# Patient Record
Sex: MALE | Race: WHITE | Employment: OTHER | URBAN - METROPOLITAN AREA
[De-identification: names, ages, dates, MRNs, and addresses within clinical notes are randomized per-mention and may not be internally consistent; named-entity substitution may affect disease eponyms.]

---

## 2022-02-01 ENCOUNTER — APPOINTMENT (OUTPATIENT)
Dept: GENERAL RADIOLOGY | Age: 32
End: 2022-02-01
Payer: COMMERCIAL

## 2022-02-01 ENCOUNTER — HOSPITAL ENCOUNTER (EMERGENCY)
Age: 32
Discharge: HOME | End: 2022-02-01
Attending: EMERGENCY MEDICINE
Payer: COMMERCIAL

## 2022-02-01 VITALS
TEMPERATURE: 97.5 F | DIASTOLIC BLOOD PRESSURE: 90 MMHG | SYSTOLIC BLOOD PRESSURE: 155 MMHG | RESPIRATION RATE: 18 BRPM | HEIGHT: 73 IN | HEART RATE: 65 BPM | WEIGHT: 265 LBS | OXYGEN SATURATION: 99 % | BODY MASS INDEX: 35.12 KG/M2

## 2022-02-01 DIAGNOSIS — B34.9 VIRAL SYNDROME: ICD-10-CM

## 2022-02-01 DIAGNOSIS — R03.0 ELEVATED BLOOD PRESSURE READING: ICD-10-CM

## 2022-02-01 DIAGNOSIS — U07.1 COVID-19 VIRUS INFECTION: Primary | ICD-10-CM

## 2022-02-01 LAB
ALBUMIN SERPL-MCNC: 3.6 GM/DL (ref 3.4–5)
ALP BLD-CCNC: 68 IU/L (ref 40–129)
ALT SERPL-CCNC: 23 U/L (ref 10–40)
ANION GAP SERPL CALCULATED.3IONS-SCNC: 15 MMOL/L (ref 4–16)
AST SERPL-CCNC: 30 IU/L (ref 15–37)
BANDED NEUTROPHILS ABSOLUTE COUNT: 0.62 K/CU MM
BANDED NEUTROPHILS RELATIVE PERCENT: 10 % (ref 5–11)
BILIRUB SERPL-MCNC: 0.4 MG/DL (ref 0–1)
BUN BLDV-MCNC: 9 MG/DL (ref 6–23)
CALCIUM SERPL-MCNC: 8.7 MG/DL (ref 8.3–10.6)
CHLORIDE BLD-SCNC: 99 MMOL/L (ref 99–110)
CO2: 22 MMOL/L (ref 21–32)
CREAT SERPL-MCNC: 0.8 MG/DL (ref 0.9–1.3)
DIFFERENTIAL TYPE: ABNORMAL
DOSE AMOUNT: NORMAL
DOSE TIME: NORMAL
GFR AFRICAN AMERICAN: >60 ML/MIN/1.73M2
GFR NON-AFRICAN AMERICAN: >60 ML/MIN/1.73M2
GLUCOSE BLD-MCNC: 81 MG/DL (ref 70–99)
HCT VFR BLD CALC: 50 % (ref 42–52)
HEMOGLOBIN: 17.3 GM/DL (ref 13.5–18)
LACTATE: 0.9 MMOL/L (ref 0.4–2)
LYMPHOCYTES ABSOLUTE: 1.6 K/CU MM
LYMPHOCYTES RELATIVE PERCENT: 25 % (ref 24–44)
MAGNESIUM: 1.9 MG/DL (ref 1.8–2.4)
MCH RBC QN AUTO: 34 PG (ref 27–31)
MCHC RBC AUTO-ENTMCNC: 34.6 % (ref 32–36)
MCV RBC AUTO: 98.2 FL (ref 78–100)
MONOCYTES ABSOLUTE: 1 K/CU MM
MONOCYTES RELATIVE PERCENT: 16 % (ref 0–4)
PDW BLD-RTO: 11.1 % (ref 11.7–14.9)
PLATELET # BLD: 154 K/CU MM (ref 140–440)
PLT MORPHOLOGY: ABNORMAL
PMV BLD AUTO: 11.9 FL (ref 7.5–11.1)
POTASSIUM SERPL-SCNC: 3.7 MMOL/L (ref 3.5–5.1)
RBC # BLD: 5.09 M/CU MM (ref 4.6–6.2)
SARS-COV-2, NAAT: DETECTED
SEGMENTED NEUTROPHILS ABSOLUTE COUNT: 3 K/CU MM
SEGMENTED NEUTROPHILS RELATIVE PERCENT: 49 % (ref 36–66)
SODIUM BLD-SCNC: 136 MMOL/L (ref 135–145)
TOTAL PROTEIN: 6.4 GM/DL (ref 6.4–8.2)
VALPROIC ACID LEVEL: 85 UG/ML (ref 50–100)
WBC # BLD: 6.2 K/CU MM (ref 4–10.5)
WBC # BLD: ABNORMAL 10*3/UL

## 2022-02-01 PROCEDURE — 6360000002 HC RX W HCPCS: Performed by: EMERGENCY MEDICINE

## 2022-02-01 PROCEDURE — 85027 COMPLETE CBC AUTOMATED: CPT

## 2022-02-01 PROCEDURE — 71045 X-RAY EXAM CHEST 1 VIEW: CPT

## 2022-02-01 PROCEDURE — 87635 SARS-COV-2 COVID-19 AMP PRB: CPT

## 2022-02-01 PROCEDURE — 85007 BL SMEAR W/DIFF WBC COUNT: CPT

## 2022-02-01 PROCEDURE — 80164 ASSAY DIPROPYLACETIC ACD TOT: CPT

## 2022-02-01 PROCEDURE — 83605 ASSAY OF LACTIC ACID: CPT

## 2022-02-01 PROCEDURE — 80053 COMPREHEN METABOLIC PANEL: CPT

## 2022-02-01 PROCEDURE — 83735 ASSAY OF MAGNESIUM: CPT

## 2022-02-01 PROCEDURE — 2580000003 HC RX 258: Performed by: EMERGENCY MEDICINE

## 2022-02-01 PROCEDURE — 96360 HYDRATION IV INFUSION INIT: CPT

## 2022-02-01 PROCEDURE — 96361 HYDRATE IV INFUSION ADD-ON: CPT

## 2022-02-01 PROCEDURE — 96372 THER/PROPH/DIAG INJ SC/IM: CPT

## 2022-02-01 PROCEDURE — 99285 EMERGENCY DEPT VISIT HI MDM: CPT

## 2022-02-01 RX ORDER — 0.9 % SODIUM CHLORIDE 0.9 %
1000 INTRAVENOUS SOLUTION INTRAVENOUS ONCE
Status: COMPLETED | OUTPATIENT
Start: 2022-02-01 | End: 2022-02-01

## 2022-02-01 RX ORDER — ONDANSETRON 4 MG/1
4 TABLET, ORALLY DISINTEGRATING ORAL 3 TIMES DAILY PRN
Qty: 21 TABLET | Refills: 0 | Status: SHIPPED | OUTPATIENT
Start: 2022-02-01

## 2022-02-01 RX ORDER — ALBUTEROL SULFATE 90 UG/1
2 AEROSOL, METERED RESPIRATORY (INHALATION) 4 TIMES DAILY PRN
Qty: 54 G | Refills: 1 | Status: SHIPPED | OUTPATIENT
Start: 2022-02-01

## 2022-02-01 RX ORDER — ONDANSETRON 2 MG/ML
4 INJECTION INTRAMUSCULAR; INTRAVENOUS ONCE
Status: DISCONTINUED | OUTPATIENT
Start: 2022-02-01 | End: 2022-02-01 | Stop reason: HOSPADM

## 2022-02-01 RX ORDER — GUAIFENESIN 100 MG/5ML
10 SYRUP ORAL EVERY 4 HOURS PRN
Qty: 236 ML | Refills: 0 | Status: SHIPPED | OUTPATIENT
Start: 2022-02-01

## 2022-02-01 RX ORDER — LORAZEPAM 2 MG/ML
1 INJECTION INTRAMUSCULAR ONCE
Status: COMPLETED | OUTPATIENT
Start: 2022-02-01 | End: 2022-02-01

## 2022-02-01 RX ADMIN — SODIUM CHLORIDE 1000 ML: 9 INJECTION, SOLUTION INTRAVENOUS at 11:23

## 2022-02-01 RX ADMIN — LORAZEPAM 1 MG: 2 INJECTION INTRAMUSCULAR at 10:37

## 2022-02-01 NOTE — ED NOTES
Discussed discharge instructions with patient's brother. All questions answered. Patient's brother verbalized understanding.           Oleg Mena RN  02/01/22 [address] [facility] [address], RN  02/01/22 9241

## 2022-02-01 NOTE — ED PROVIDER NOTES
Emergency Department Encounter    Patient: Kiarra Olvera  MRN: [id number]  : 1990  Date of Evaluation: 2022  ED Provider:  Hailey Cruz MD      Triage Chief Complaint:   Concern For COVID-19 (pt is autistic and lives at home with parents, parents have been sick with covid, older brother brought pt in because pt has not been eating or drinking in the last few days, pt has not had much urine output so family worried he may be dehydrated, pt had 1 episode of emesis last night )      [place]:  Kiarra Olvera is a 34 y.o. male that presents to the emergency department with brother who is able to provide history. The patient's mother who is legal guardian is readily available to assist and provide consent. Mother indicates that over about the past week, patient has been having a constellation of symptoms that started with a \"cold. \"  He developed a dry cough and likely head congestion. He had a few episodes of diarrhea, that resolved about 2 days ago. Family is concerned that his appetite appears diminished. They deny any vomiting. Urine output appears diminished, and they are concerned he may be dehydrated. They are concerned that his clothes seem to be fitting more loosely and that he may have lost weight. They have not specifically weighed him. Both mother and father were diagnosed with COVID-19 on 2021. Patient is vaccinated but has not had a booster. Patient has history of moderate to severe autism, limiting his ability to participate with history and physical exam.  Family reports no other particular provocative or alleviating factors. ROS - see HPI, below listed is current ROS at time of my eval:  CONSTITUTIONAL: No fevers, chills, or sweats. EYES: No eye redness or drainage. HENT: As above. No apparent difficulty swallowing. RESPIRATORY: No feeding intolerance or distress. CARDIOVASCULAR: No feeding intolerance, orthopnea, or edema.   GASTROINTESTINAL: As above.  GENITOURINARY: As above. No reported hematuria. MUSCULOSKELETAL: No recent injury. No neck, back, or extremity pain. NEUROLOGICAL: No focal weakness, numbness, or tingling. SKIN: No rashes or other lesions reported. No yellowing of the skin. Medical history:  Past Medical History:   Diagnosis Date    Autistic disorder     Seizures ([facility] [address]      Past Surgical History:   Procedure Laterality Date    EYE SURGERY       History reviewed. No pertinent family history. Social History     Socioeconomic History    Marital status: Single     Spouse name: Not on file    Number of children: Not on file    Years of education: Not on file    Highest education level: Not on file   Occupational History    Not on file   Tobacco Use    Smoking status: Never Smoker    Smokeless tobacco: Never Used   Substance and Sexual Activity    Alcohol use: No    Drug use: No    Sexual activity: Not on file   Other Topics Concern    Not on file   Social History Narrative    Not on file     Social Determinants of Health     Financial Resource Strain:     Difficulty of Paying Living Expenses: Not on file   Food Insecurity:     Worried About Running Out of Food in the Last Year: Not on file    Naomie of Food in the Last Year: Not on file   Transportation Needs:     Lack of Transportation (Medical): Not on file    Lack of Transportation (Non-Medical):  Not on file   Physical Activity:     Days of Exercise per Week: Not on file    Minutes of Exercise per Session: Not on file   Stress:     Feeling of Stress : Not on file   Social Connections:     Frequency of Communication with Friends and Family: Not on file    Frequency of Social Gatherings with Friends and Family: Not on file    Attends Hoahaoism Services: Not on file    Active Member of Clubs or Organizations: Not on file    Attends Club or Organization Meetings: Not on file    Marital Status: Not on file   Intimate Partner Violence:     Fear of Current or Ex-Partner: Not on file    Emotionally Abused: Not on file    Physically Abused: Not on file    Sexually Abused: Not on file   Housing Stability:     Unable to Pay for Housing in the Last Year: Not on file    Number of Places Lived in the Last Year: Not on file    Unstable Housing in the Last Year: Not on file     No current facility-administered medications for this encounter. Current Outpatient Medications   Medication Sig Dispense Refill    ondansetron (ZOFRAN-ODT) 4 MG disintegrating tablet Take 1 tablet by mouth 3 times daily as needed for Nausea or Vomiting 21 tablet 0    albuterol sulfate HFA (VENTOLIN HFA) 108 (90 Base) MCG/ACT inhaler Inhale 2 puffs into the lungs 4 times daily as needed for Wheezing or Shortness of Breath With spacer 54 g 1    guaiFENesin (ALTARUSSIN) 100 MG/5ML syrup Take 10 mLs by mouth every 4 hours as needed for Cough 236 mL 0    DULoxetine (CYMBALTA) 30 MG extended release capsule Take 30 mg by mouth daily      benztropine (COGENTIN) 2 MG tablet Take 2 mg by mouth 2 times daily      PARoxetine (PAXIL) 10 MG tablet Take 10 mg by mouth every morning.  QUEtiapine (SEROQUEL) 25 MG tablet Take 25 mg by mouth 2 times daily.  LORazepam (ATIVAN) 0.5 MG tablet Take 0.5 mg by mouth every 6 hours as needed for Anxiety.  divalproex (DEPAKOTE) 500 MG DR tablet Take 500 mg by mouth 2 times daily.  pantoprazole (PROTONIX) 40 MG tablet Take 40 mg by mouth daily.  linaclotide (LINZESS) 145 MCG capsule Take 145 mcg by mouth every morning (before breakfast). No Known Allergies    Nursing Notes Reviewed    Physical Exam:  Triage VS:    ED Triage Vitals   Enc Vitals Group      BP       Pulse       Resp       Temp       Temp src       SpO2       Weight       Height       Head Circumference       Peak Flow       Pain Score       Pain Loc       Pain Edu? Excl. in [address]?         My pulse ox interpretation is -94% at triage    GENERAL: Patient is awake, alert, and oriented appropriately. Patient is resting comfortably in a still position on the exam table. Patient speaking in full and complete sentences. HEENT: Normocephalic and atraumatic. Pupils equal, round, and reactive to light. No redness or matting. Bilateral external ears are unremarkable. Nasal mucosa is pink without purulence. Oral mucosa is mildly dry with chapped lips. NECK: Supple with normal range of motion. No Kernig's or Brudzinski sign. No JVD. No significant lymphadenopathy. RESPIRATORY: Symmetric aeration. No respiratory distress. Faint coarse sounds throughout with no wheeze, stridor, rales, rhonchi. Chest wall stable and nontender. CARDIOVASCULAR: Regular rate and rhythm. No murmurs, rubs, or gallops. No central or peripheral cyanosis. GASTROINTESTINAL: Soft, nontender, and nondistended. No McBurney's or Martínez's point tenderness. No other focal tenderness. No involuntary guarding, rebound, or rigidity. No mass or pulsatile mass. Bowel sounds normal.  No CVA tenderness. NEUROLOGICAL: Awake, alert and oriented to self and family. GCS 15. Cranial nerves III through XII are grossly intact as tested without facial droop or dermatomal paresthesias. Of note, forehead wrinkles are symmetric and intact. Conjugate gaze without entrapment. No asymmetry of the corners of the mouth or nasolabial folds. No gross motor or cerebellar deficits. Patient has difficulty cooperating with neurological exam, but he is seen to move all extremities spontaneously and symmetrically. He does not answer questions but is frequently singing. BACK/MUSCULOSKELETAL: No asymmetric edema or calf tenderness. No Homans sign or cords. SKIN: Normal tone for ethnicity. Normal turgor and brisk capillary refill peripherally. Emergency department course. Patient is brought to bed 2 and assessed and reassessed by me.   After initial evaluation, orders are placed for medical screening studies including CBC, metabolic panel, urinalysis, and rapid Covid. Chest x-ray is ordered. Patient appears somewhat dehydrated on examination, though blood pressure and heart rate are acceptable. IV fluids are ordered along with ondansetron 4 mg and lorazepam 1 mg to facilitate cooperation with IV placement. Patient is agreeable to continuing plan. Patient has been assessed and reassessed on several occasions. He remains generally well and clinically stable. There has been normal saturations on room air. Family and I have discussed options for disposition. The average patient with COVID-19 and normal oxygen saturations would likely be still a good candidate for home management. With the patient's underlying autism and his parents recent COVID-23 diagnosis, we had discussed possible medical management for hydration in the hospital.  Family had some concerns about visitation policy and that being alone could cause unnecessary agitation with the patient. After shared decision-making, family would like to attempt for home management. As noted, patient is saturating normally on room air, and laboratory testing is generally reassuring. I believe this is reasonable decision. We have discussed very careful return and follow-up instructions. We have discussed all available results. Patient is satisfied with evaluation and agreeable to recommendations. Patient has had the opportunity to ask questions, and they have been answered to the best of my ability. Instructions are given to follow-up with primary care provider for reevaluation and further testing. Very strict return and follow-up instructions are provided. Patient seen during [facility], I did don appropriate PPE during my encounters with the patient, including n95 (when appropriate) mask and eye protection as appropriate.     I have reviewed and interpreted all of the currently available lab results from this visit (if applicable):  Results for orders placed or performed during the hospital encounter of 02/01/22   COVID-19, Rapid    Specimen: Nasopharyngeal   Result Value Ref Range    SARS-CoV-2, NAAT DETECTED (A) NOT DETECTED   CBC Auto Differential   Result Value Ref Range    WBC 6.2 4.0 - 10.5 K/CU MM    RBC 5.09 4.6 - 6.2 M/CU MM    Hemoglobin 17.3 13.5 - 18.0 GM/DL    Hematocrit 50.0 42 - 52 %    MCV 98.2 78 - 100 FL    MCH 34.0 (H) 27 - 31 PG    MCHC 34.6 32.0 - 36.0 %    RDW 11.1 (L) 11.7 - 14.9 %    Platelets 272 404 - 945 K/CU MM    MPV 11.9 (H) 7.5 - 11.1 FL    Bands Relative 10 5 - 11 %    Segs Relative 49.0 36 - 66 %    Lymphocytes % 25.0 24 - 44 %    Monocytes % 16.0 (H) 0 - 4 %    Bands Absolute 0.62 K/CU MM    Segs Absolute 3.0 K/CU MM    Lymphocytes Absolute 1.6 K/CU MM    Monocytes Absolute 1.0 K/CU MM    Differential Type MANUAL DIFFERENTIAL     WBC Morphology OCCASIONAL     PLT Morphology PATIENT HAS VARIANT SIZE PLATELETS NOTED ON SLIDE    Comprehensive Metabolic Panel w/ Reflex to MG   Result Value Ref Range    Sodium 136 135 - 145 MMOL/L    Potassium 3.7 3.5 - 5.1 MMOL/L    Chloride 99 99 - 110 mMol/L    CO2 22 21 - 32 MMOL/L    BUN 9 6 - 23 MG/DL    CREATININE 0.8 (L) 0.9 - 1.3 MG/DL    Glucose 81 70 - 99 MG/DL    Calcium 8.7 8.3 - 10.6 MG/DL    Albumin 3.6 3.4 - 5.0 GM/DL    Total Protein 6.4 6.4 - 8.2 GM/DL    Total Bilirubin 0.4 0.0 - 1.0 MG/DL    ALT 23 10 - 40 U/L    AST 30 15 - 37 IU/L    Alkaline Phosphatase 68 40 - 129 IU/L    GFR Non-African American >60 >60 mL/min/1.73m2    GFR African American >60 >60 mL/min/1.73m2    Anion Gap 15 4 - 16   Lactic Acid, Plasma   Result Value Ref Range    Lactate 0.9 0.4 - 2.0 mMOL/L   Magnesium   Result Value Ref Range    Magnesium 1.9 1.8 - 2.4 mg/dl   Valproic acid level, total   Result Value Ref Range    Valproic Acid Lvl 85.0 50 - 100 UG/ML    DOSE AMOUNT DOSE AMT.  GIVEN - 500 mg     DOSE TIME DOSE TIME GIVEN - bid         Radiographs (if obtained):  Radiologist's Report Reviewed:  XR CHEST PORTABLE    Result Date: 2/1/2022  EXAMINATION: ONE X-RAY VIEW OF THE CHEST 2/1/2022 10:39 am COMPARISON: None. HISTORY: ORDERING SYSTEM PROVIDED HISTORY: Not eating, vomiting, Covid exposure TECHNOLOGIST PROVIDED HISTORY: Reason for exam:->Not eating, vomiting, Covid exposure Reason for Exam: Not eating, vomiting, Covid exposure FINDINGS: There are slightly low lung volumes. Heart size is within normal limits for technique. No pleural effusion or pneumothorax is seen. Suspected mild airspace opacities predominantly in the right mid and lower lung. Suspected mild airspace opacities predominantly in the right mid and lower lung. This could represent infection, including COVID-19 pneumonia. Medical decision making:  As discussed. Patient presents to the emergency department with signs and symptoms consistent with an upper respiratory infection, consistent with his COVID-19 diagnosis, particularly with his known sick contacts. Fortunately, there has been no respiratory distress, hypoxia, or cyanosis. There has been some concern about hydration and nutritional status, but renal function is intact, and there is no detectable change in renal function or inappropriate acidosis. Medical screening examination does not suggest pneumonia or other septic process. I doubt meningitis, encephalitis, cerebritis, or subarachnoid hemorrhage. There have been no meningeal findings. Abdomen has been soft and nontender. There has been no respiratory distress, hypoxia, or cyanosis. There has been no lethargy or irritability. Patient appears generally well hydrated and nontoxic. We have discussed trial of outpatient management including conscientious hydration and careful follow-up with primary care provider. Blood pressure is elevated, but there has been no complaint of headache, chest pain or discomfort, difficulty breathing, or other symptoms to suggest endorgan injury.   Recommendations are given to follow-up with primary care provider for long-term monitoring. Procedures: None. Consultations: None. Clinical Impression:  1. COVID-19 virus infection    2. Viral syndrome    3. Elevated blood pressure reading      Disposition referral (if applicable): Ying Adams MD  [place] [address] [address] [phone]    Schedule an appointment as soon as possible for a visit in 2 days      [facility]  [facility] [phone]  Go to   As needed, If symptoms worsen    Disposition medications (if applicable):  Discharge Medication List as of 2/1/2022  3:11 PM      START taking these medications    Details   albuterol sulfate HFA (VENTOLIN HFA) 108 (90 Base) MCG/ACT inhaler Inhale 2 puffs into the lungs 4 times daily as needed for Wheezing or Shortness of Breath With spacer, Disp-54 g, R-1Print      guaiFENesin (ALTARUSSIN) 100 MG/5ML syrup Take 10 mLs by mouth every 4 hours as needed for Cough, Disp-236 mL, R-0Print           ED Provider Disposition Time  DISPOSITION Decision To Discharge 02/01/2022 03:04:52 PM      Comment: Please note this report has been produced using speech recognition software and may contain errors related to that system including errors in grammar, punctuation, and spelling, as well as words and phrases that may be inappropriate. Efforts were made to edit the dictations.         Ammy Haywood MD  02/02/22 1197

## 2022-02-01 NOTE — ED NOTES
Bed: ED-02  Expected date:   Expected time:   Means of arrival:   Comments:  lauren Ponce RN  02/01/22 [id number]

## 2022-02-02 ENCOUNTER — CARE COORDINATION (OUTPATIENT)
Dept: CARE COORDINATION | Age: 32
End: 2022-02-02

## 2022-02-02 NOTE — CARE COORDINATION
First attempt. [facility] call to pt parents regarding ER / Covid follow up. No answer. Left message for pt parents requesting return call to [facility].

## 2022-02-03 NOTE — CARE COORDINATION
Second attempt. [facility] call to pts parent regarding ER / Covid follow up. No answer. Left message for pts parent requesting return call to [facility]. No further outreach to be completed.

## 2023-05-08 ENCOUNTER — OFFICE VISIT (OUTPATIENT)
Dept: NEUROLOGY | Age: 33
End: 2023-05-08
Payer: MEDICAID

## 2023-05-08 VITALS
HEART RATE: 92 BPM | SYSTOLIC BLOOD PRESSURE: 122 MMHG | OXYGEN SATURATION: 98 % | BODY MASS INDEX: 33.13 KG/M2 | HEIGHT: 73 IN | DIASTOLIC BLOOD PRESSURE: 80 MMHG | WEIGHT: 250 LBS

## 2023-05-08 DIAGNOSIS — G40.909 SEIZURE DISORDER (HCC): Primary | ICD-10-CM

## 2023-05-08 PROCEDURE — 99245 OFF/OP CONSLTJ NEW/EST HI 55: CPT | Performed by: STUDENT IN AN ORGANIZED HEALTH CARE EDUCATION/TRAINING PROGRAM

## 2023-05-08 RX ORDER — ALLOPURINOL 100 MG/1
100 TABLET ORAL
COMMUNITY

## 2023-05-08 RX ORDER — CLONIDINE HYDROCHLORIDE 0.1 MG/1
0.1 TABLET ORAL 3 TIMES DAILY
COMMUNITY

## 2023-05-08 RX ORDER — TRAZODONE HYDROCHLORIDE 50 MG/1
100 TABLET ORAL NIGHTLY
COMMUNITY

## 2023-05-08 RX ORDER — OXYBUTYNIN CHLORIDE 10 MG/1
10 TABLET, EXTENDED RELEASE ORAL DAILY
COMMUNITY
Start: 2023-03-20

## 2023-05-08 RX ORDER — PERPHENAZINE 2 MG/1
2 TABLET ORAL NIGHTLY
COMMUNITY
Start: 2023-04-23

## 2023-05-08 RX ORDER — LEVETIRACETAM 750 MG/1
750 TABLET ORAL 2 TIMES DAILY
Qty: 60 TABLET | Refills: 3 | Status: SHIPPED | OUTPATIENT
Start: 2023-06-02

## 2023-05-08 RX ORDER — LEVETIRACETAM 500 MG/1
750 TABLET ORAL 2 TIMES DAILY
Qty: 90 TABLET | Refills: 0 | Status: SHIPPED | OUTPATIENT
Start: 2023-05-08

## 2023-05-08 RX ORDER — NALTREXONE HYDROCHLORIDE 50 MG/1
50 TABLET, FILM COATED ORAL 2 TIMES DAILY
COMMUNITY

## 2023-05-08 NOTE — PATIENT INSTRUCTIONS
Week 1: Start Keppra 500 mg (one tablet)  twice daily, No change to depakote  Week 2:  Increase Keppra to 750 mg (1.5 tablets) twice daily, No change depakote  Week 3: Continue Keppra 750 mg twice daily, Decrease depakote to 250mg (1/2 tablet) three times daily  Week 4: Continue Keppra 750mg twice daily, decrease depakote 250mg twice daily   Week 5: Continue Keppra 750mg twice daily, decrease depakote 250mg once daily   Week 6: Continue Keppra 750mg twice daily, STOP depakote

## 2023-05-08 NOTE — PROGRESS NOTES
Stance   Gait/Posture: station normal, ambulates independently, and gait normal  Motor/Coordination Exam   General: no bradykinesia, mild resting tremor noted bilaterally, no chorea, no athetosis, no myoclonus, and no dyskinesia   Motor: Antigravity x4 extremities does not follow commands enough to participate with directed assessment   Coordination: No overt dysmetria  Reflexes   Reflexes Right: 2/4 biceps, brachioradialis, patellar, and achilles    Reflexes Left: 2/4 biceps, brachioradialis, patellar, and achilles    Hoffmans Reflex Right: Present   Hoffmans Reflex Left: Present    Sensory   Sensation: Attends to stimulus x4 extremities    Lungs   No auditory wheezing  Skin   Inspection: no jaundice, no lesions, no rashes, and no cyanosis      /80 (Site: Left Upper Arm, Position: Sitting, Cuff Size: Medium Adult)   Pulse 92   Ht 6' 1\" (1.854 m)   Wt 250 lb (113.4 kg)   SpO2 98%   BMI 32.98 kg/m²     Assessment and Plan     Diagnosis Orders   1. Seizure disorder (HCC)  levETIRAcetam (KEPPRA) 500 MG tablet    levETIRAcetam (KEPPRA) 750 MG tablet        Aly Islas was seen today in neurologic consultation regarding history of seizure disorder diagnosed in childhood. He has been maintained on Depakote 500 mg twice daily with last seizure greater than 2 years ago. Although he has been stable on Depakote, family does endorse that he has recently began to have tremors and are hoping that they can transition him to a newer agent with less potential side effects. We spent time today discussing multiple antiseizure medications and have ultimately elected to move forward with initiating him on Keppra given no monitoring is needed. I am going to start him on Keppra 500 mg twice daily for 1 week then have him increase to 750 mg twice daily thereafter. After having been on Keppra 750 mg twice daily for a full week,  family will begin to wean Depakote following schedule that was provided to them.   The entire

## 2023-05-30 DIAGNOSIS — G40.909 SEIZURE DISORDER (HCC): ICD-10-CM

## 2023-05-30 RX ORDER — LEVETIRACETAM 500 MG/1
750 TABLET ORAL 2 TIMES DAILY
Qty: 90 TABLET | Refills: 0 | OUTPATIENT
Start: 2023-05-30

## 2023-05-30 RX ORDER — LEVETIRACETAM 750 MG/1
TABLET ORAL
Qty: 60 TABLET | Refills: 3 | OUTPATIENT
Start: 2023-05-30

## 2023-07-28 ENCOUNTER — TELEPHONE (OUTPATIENT)
Dept: NEUROLOGY | Age: 33
End: 2023-07-28

## 2023-07-28 DIAGNOSIS — G40.909 SEIZURE DISORDER (HCC): Primary | ICD-10-CM

## 2023-07-28 NOTE — TELEPHONE ENCOUNTER
Patient's mother called and states the patient's psychiatrist would like to have Dr. Dodie Nelson call and discuss with them about medication options that would allow him to take less meds if there can be one med that helps treat both mood issues and seizures. She states their name is Dr. Cuauhtemoc Lin and their number is [phone]. Please advise. Also note that mother states patient is doing very well and stable on current med of Keppra.

## 2023-07-31 NOTE — TELEPHONE ENCOUNTER
Called patient's family and left a message that Dr. Royce Redman and the patient's other provider were able to talk and come to an agreement that a certain medication would be able to treat both issues that are in question for the patient and they both agreed would be an excellent option for him. Stated there would be an outline addressing and explaining the titration schedule and weaning off schedule of the current med that we can go over if they are interested in us prescribing the med, and or they can also discuss with the other provider as well, but to call back and let us know so we can get the patient started.

## 2023-08-01 RX ORDER — LAMOTRIGINE 100 MG/1
TABLET ORAL
Qty: 53 TABLET | Refills: 0 | Status: SHIPPED | OUTPATIENT
Start: 2023-09-19

## 2023-08-01 RX ORDER — LAMOTRIGINE 25 MG/1
TABLET ORAL
Qty: 147 TABLET | Refills: 0 | Status: SHIPPED | OUTPATIENT
Start: 2023-08-01

## 2023-08-01 RX ORDER — LAMOTRIGINE 100 MG/1
150 TABLET ORAL 2 TIMES DAILY
Qty: 90 TABLET | Refills: 3 | Status: SHIPPED | OUTPATIENT
Start: 2023-10-10

## 2023-08-01 NOTE — TELEPHONE ENCOUNTER
Pt's mother is agreeable to this plan. She would like a schedule to wean him off of Iftikhar Chambers.

## 2023-08-03 NOTE — TELEPHONE ENCOUNTER
Called patient's mother and went over the directions and the weaning off dosing for Keppra and along with the Lamictal titration dosing. Mother stated understanding.

## 2023-08-08 ENCOUNTER — OFFICE VISIT (OUTPATIENT)
Dept: NEUROLOGY | Age: 33
End: 2023-08-08
Payer: MEDICAID

## 2023-08-08 VITALS
HEIGHT: 73 IN | HEART RATE: 58 BPM | OXYGEN SATURATION: 96 % | WEIGHT: 250 LBS | SYSTOLIC BLOOD PRESSURE: 126 MMHG | BODY MASS INDEX: 33.13 KG/M2 | DIASTOLIC BLOOD PRESSURE: 80 MMHG

## 2023-08-08 DIAGNOSIS — G40.909 SEIZURE DISORDER (HCC): Primary | ICD-10-CM

## 2023-08-08 PROCEDURE — 99213 OFFICE O/P EST LOW 20 MIN: CPT | Performed by: NURSE PRACTITIONER

## 2023-08-08 NOTE — PROGRESS NOTES
9/19/2023] lamoTRIgine (LAMICTAL) 100 MG tablet 1 tab bid x7d; 1 tab QAM & 1 tab QPM x7d; 1.5 tab bid x 7d; new Rx (Patient not taking: Reported on 8/8/2023) 53 tablet 0    levETIRAcetam (KEPPRA) 500 MG tablet Take 1.5 tablets by mouth 2 times daily (Patient not taking: Reported on 8/8/2023) 90 tablet 0    divalproex (DEPAKOTE) 500 MG DR tablet Take 1 tablet by mouth 2 times daily      linaclotide (LINZESS) 145 MCG capsule Take 1 capsule by mouth every morning (before breakfast)       No current facility-administered medications for this visit. Physical Exam:  Mental Status              Orientation: Unable to assess orientation but does respond to name with acknowledgment              Mood/Affect: Pleasant and agreeable              Memory/Other:  We will to assess memory  Language              Language: Largely nonverbal, does make multiple guttural noises and will occasionally say \"mom\", \"dad\"  Cranial Nerves; blinks to threat, will track examiner around the room, no overt ptosis, handling secretions appropriately, muscles of facial expression appear grossly symmetric, normal sternocleidomastoid, tongue protrudes midline     Gait and Stance              Gait/Posture: station normal, ambulates independently, and gait normal  Motor/Coordination Exam              General: no bradykinesia, mild resting tremor noted bilaterally, no chorea, no athetosis, no myoclonus, and no dyskinesia              Motor: Antigravity x4 extremities does not follow commands enough to participate with directed assessment              Coordination: No overt dysmetria  Reflexes              Reflexes Right: 2/4 biceps, brachioradialis, patellar, and achilles               Reflexes Left: 2/4 biceps, brachioradialis, patellar, and achilles               Hoffmans Reflex Right: Present              Hoffmans Reflex Left: Present     Sensory              Sensation: Attends to stimulus x4 extremities     /80 (Site: Left Upper Arm,

## 2023-08-24 DIAGNOSIS — G40.909 SEIZURE DISORDER (HCC): ICD-10-CM

## 2023-08-24 RX ORDER — LAMOTRIGINE 100 MG/1
150 TABLET ORAL 2 TIMES DAILY
Qty: 90 TABLET | Refills: 3 | OUTPATIENT
Start: 2023-08-24

## 2023-09-15 DIAGNOSIS — G40.909 SEIZURE DISORDER (HCC): ICD-10-CM

## 2023-09-15 NOTE — TELEPHONE ENCOUNTER
Requested Prescriptions     Pending Prescriptions Disp Refills    levETIRAcetam (KEPPRA) 750 MG tablet 60 tablet 3     Sig: Take 1 tablet by mouth 2 times daily

## 2023-09-17 DIAGNOSIS — G40.909 SEIZURE DISORDER (HCC): ICD-10-CM

## 2023-09-18 RX ORDER — LAMOTRIGINE 25 MG/1
TABLET ORAL
Qty: 147 TABLET | Refills: 0 | OUTPATIENT
Start: 2023-09-18

## 2023-09-19 DIAGNOSIS — G40.909 SEIZURE DISORDER (HCC): ICD-10-CM

## 2023-09-19 RX ORDER — LEVETIRACETAM 750 MG/1
750 TABLET ORAL 2 TIMES DAILY
Qty: 60 TABLET | Refills: 3 | Status: SHIPPED | OUTPATIENT
Start: 2023-09-19

## 2023-09-19 NOTE — TELEPHONE ENCOUNTER
Pt mother called stating pt is still transitioning off of the keppra and needs new rx until he has completed weaning off of medication. She stated he only has one day left. Rx pending.

## 2023-09-20 RX ORDER — LEVETIRACETAM 750 MG/1
750 TABLET ORAL 2 TIMES DAILY
Qty: 180 TABLET | Refills: 1 | OUTPATIENT
Start: 2023-09-20

## 2023-12-11 DIAGNOSIS — G40.909 SEIZURE DISORDER (HCC): ICD-10-CM

## 2023-12-11 RX ORDER — LAMOTRIGINE 100 MG/1
150 TABLET ORAL 2 TIMES DAILY
Qty: 270 TABLET | Refills: 1 | Status: SHIPPED | OUTPATIENT
Start: 2023-12-11

## 2024-03-22 ENCOUNTER — TELEPHONE (OUTPATIENT)
Dept: NEUROLOGY | Age: 34
End: 2024-03-22

## 2024-03-22 NOTE — TELEPHONE ENCOUNTER
Received call from pt psychiatrist Dr. Yan stating she recently saw pt and pt is scheduled for ov with Larry 3/26 to discuss meds. She stated she wanted the provider to be aware that pt was switched from Depakote to Keppra then from Keppra to Lamictal. She stated that since changing medications pt has exhibited increased behavioral issues, including agitation, irritability, and has been unsettled. She stated pt was initially switched from Depakote due to tremor. She stated during their visit she discussed mood stabilizers and anti-epileptics and that she prescribes Cogentin.  She stated patient's parents are interested in possibly switching back to Depakote since behavioral concerns are more of a concern that tremor. She stated she is willing to discuss with provider if needed.

## 2024-03-26 ENCOUNTER — OFFICE VISIT (OUTPATIENT)
Dept: NEUROLOGY | Age: 34
End: 2024-03-26
Payer: MEDICAID

## 2024-03-26 VITALS
DIASTOLIC BLOOD PRESSURE: 64 MMHG | BODY MASS INDEX: 32.61 KG/M2 | HEART RATE: 70 BPM | SYSTOLIC BLOOD PRESSURE: 112 MMHG | WEIGHT: 247.2 LBS | OXYGEN SATURATION: 94 %

## 2024-03-26 DIAGNOSIS — R46.89 BEHAVIORAL CHANGE: ICD-10-CM

## 2024-03-26 DIAGNOSIS — G40.909 SEIZURE DISORDER (HCC): Primary | ICD-10-CM

## 2024-03-26 PROCEDURE — 99213 OFFICE O/P EST LOW 20 MIN: CPT | Performed by: NURSE PRACTITIONER

## 2024-03-26 RX ORDER — FLUOXETINE HYDROCHLORIDE 20 MG/1
40 CAPSULE ORAL DAILY
COMMUNITY

## 2024-03-26 RX ORDER — CLONAZEPAM 2 MG/1
2 TABLET ORAL 2 TIMES DAILY
COMMUNITY

## 2024-03-26 RX ORDER — DIVALPROEX SODIUM 500 MG/1
500 TABLET, DELAYED RELEASE ORAL 2 TIMES DAILY
Qty: 60 TABLET | Refills: 11 | Status: SHIPPED | OUTPATIENT
Start: 2024-03-26

## 2024-03-26 NOTE — PROGRESS NOTES
3/26/24    Sawyer Underwood  1990    Chief Complaint   Patient presents with    Follow-up     Medication review       History of Present Illness  Sawyer is a 34 y.o. male presenting today for follow-up of:seizure disorder diagnosed in childhood.  He has been maintained on Depakote 500 mg twice daily but started having a tremor.  His family wanted to try another antiseizure agent with less potential side effects.  He will switch to Keppra 750 mg twice daily, he remains seizure-free but developed behavioral changes.  He was switched to Lamictal 150 mg twice daily.  He continues to have behavioral issues and his family wanted started back on Depakote 500 mg twice daily.    They are also interested in another psychiatric referral.    Prior AEDs: Neurontin, phenobarbital, Keppra, Lamictal, Depakote  Current Outpatient Medications   Medication Sig Dispense Refill    divalproex (DEPAKOTE) 500 MG DR tablet Take 1 tablet by mouth 2 times daily 60 tablet 11    cloNIDine (CATAPRES) 0.1 MG tablet Take 1 tablet by mouth in the morning, at noon, and at bedtime      allopurinol (ZYLOPRIM) 100 MG tablet Take 1 tablet by mouth      traZODone (DESYREL) 50 MG tablet Take 2 tablets by mouth at bedtime      perphenazine 2 MG tablet Take 1 tablet by mouth nightly      Prucalopride Succinate 2 MG TABS Take 2 mg by mouth nightly      clonazePAM (KLONOPIN) 2 MG tablet Take 1 tablet by mouth 2 times daily. Max Daily Amount: 4 mg      FLUoxetine (PROZAC) 20 MG capsule Take 2 capsules by mouth daily      oxybutynin (DITROPAN-XL) 10 MG extended release tablet Take 1 tablet by mouth daily 1/2 tab (Patient not taking: Reported on 3/26/2024)      naltrexone (DEPADE) 50 MG tablet Take 1 tablet by mouth in the morning and at bedtime (Patient not taking: Reported on 3/26/2024)      LORazepam (ATIVAN) 0.5 MG tablet Take 1 tablet by mouth every 6 hours as needed for Anxiety. (Patient not taking: Reported on 3/26/2024)      pantoprazole (PROTONIX) 40

## 2024-06-26 DIAGNOSIS — G40.909 SEIZURE DISORDER (HCC): ICD-10-CM

## 2024-06-27 ENCOUNTER — TELEPHONE (OUTPATIENT)
Dept: NEUROLOGY | Age: 34
End: 2024-06-27

## 2024-06-27 NOTE — TELEPHONE ENCOUNTER
I called pt and pts mother sandi (on HIPAA) answered. She was informed of pts results. She voiced understanding.     Larry Hugo, APRN - CNP  [facility]  Valproic acid levels are therapeutic, no changes need to be made.

## 2025-01-12 DIAGNOSIS — G40.909 SEIZURE DISORDER (HCC): ICD-10-CM

## 2025-01-13 NOTE — TELEPHONE ENCOUNTER
Patient's pharmacy requesting refill on Depakote, last seen 03/26/2024. Was expected to follow up around 09/26/2024. No follow ups scheduled.

## 2025-01-14 RX ORDER — DIVALPROEX SODIUM 500 MG/1
500 TABLET, DELAYED RELEASE ORAL 2 TIMES DAILY
Qty: 180 TABLET | Refills: 3 | Status: SHIPPED | OUTPATIENT
Start: 2025-01-14